# Patient Record
Sex: MALE | Race: WHITE | Employment: FULL TIME | URBAN - METROPOLITAN AREA
[De-identification: names, ages, dates, MRNs, and addresses within clinical notes are randomized per-mention and may not be internally consistent; named-entity substitution may affect disease eponyms.]

---

## 2017-01-11 ENCOUNTER — OFFICE VISIT (OUTPATIENT)
Dept: CARDIOLOGY CLINIC | Age: 62
End: 2017-01-11

## 2017-01-11 VITALS
DIASTOLIC BLOOD PRESSURE: 82 MMHG | HEIGHT: 66 IN | SYSTOLIC BLOOD PRESSURE: 164 MMHG | BODY MASS INDEX: 23.18 KG/M2 | RESPIRATION RATE: 16 BRPM | HEART RATE: 136 BPM | WEIGHT: 144.2 LBS | OXYGEN SATURATION: 99 %

## 2017-01-11 DIAGNOSIS — I48.0 PAROXYSMAL ATRIAL FIBRILLATION (HCC): Primary | ICD-10-CM

## 2017-01-11 NOTE — PROGRESS NOTES
Visit Vitals    /82 (BP 1 Location: Left arm, BP Patient Position: Sitting)    Pulse (!) 136    Resp 16    Ht 5' 6\" (1.676 m)    Wt 144 lb 3.2 oz (65.4 kg)    SpO2 99%    BMI 23.27 kg/m2

## 2017-01-11 NOTE — PROGRESS NOTES
HISTORY OF PRESENTING ILLNESS      Arely Salter is a 59 y.o. male established patient with history of PAF. Echocardiogram in January 2016 demonstrated preserved LV function with moderate LAE and mild to moderate MR. In the past he has been noted [address] bradycardia in the 40's which lead to lowering of his metoprolol doses. Monitoring demonstrated rhythm strips suggestive of AFL rather than AF and he underwent EPS at which time AFL could not be induced; he thus underwent empiric CTI ablation with plan to monitor for recurrence of AF. He now is noted to have. EKG today shows tachycardia however patient/wife report overall controlled rates based on their Fitbit monitoring. He is asymptomatic today despite being in AF with RVR. Of note, he is only taking metoprolol daily rather than BID due to forgetfulness. ACTIVE PROBLEM LIST     Patient Active Problem List    Diagnosis Date Noted    Paroxysmal atrial fibrillation ([address] 12/17/2015    Essential hypertension 12/17/2015    MR (mitral regurgitation) 12/17/2015           PAST MEDICAL HISTORY     Past Medical History   Diagnosis Date    Gout     HTN (hypertension)            PAST SURGICAL HISTORY     History reviewed. No pertinent past surgical history. ALLERGIES     No Known Allergies       FAMILY HISTORY     History reviewed. No pertinent family history.  negative for cardiac disease       SOCIAL HISTORY     Social History     Social History    Marital status:      Spouse name: N/A    Number of children: N/A    Years of education: N/A     Social History Main Topics    Smoking status: Former Smoker    Smokeless tobacco: Never Used    Alcohol use 1.0 oz/week     2 Glasses of wine per week    Drug use: No    Sexual activity: Not Asked     Other Topics Concern    None     Social History Narrative         MEDICATIONS     Current Outpatient Prescriptions   Medication Sig    metoprolol tartrate (LOPRESSOR) 25 mg tablet Take 1 Tab by mouth two (2) times a day.  lisinopril-hydrochlorothiazide (PRINZIDE, ZESTORETIC) 20-12.5 mg per tablet Take 1 Tab by mouth daily.  cholecalciferol, VITAMIN D3, (VITAMIN D3) 5,000 unit tab tablet Take  by mouth daily.  TURMERIC ROOT EXTRACT PO Take  by mouth daily.  garlic 2,996 mg cap Take 1,500 mg by mouth daily.  red yeast rice extract 600 mg cap Take 600 mg by mouth daily.  cyanocobalamin, vitamin B-12, 2,500 mcg tab Take 500 mg by mouth daily. No current facility-administered medications for this visit. I have reviewed the nurses notes, vitals, problem list, allergy list, medical history, family, social history and medications. REVIEW OF SYMPTOMS      General: Pt denies excessive weight gain or loss. Pt is able to conduct ADL's  HEENT: Denies blurred vision, headaches, hearing loss, epistaxis and difficulty swallowing. Respiratory: Denies cough, congestion, shortness of breath, DAVIES, wheezing or stridor. Cardiovascular: Denies precordial pain, palpitations, edema or PND  Gastrointestinal: Denies poor appetite, indigestion, abdominal pain or blood in stool  Genitourinary: Denies hematuria, dysuria, increased urinary frequency  Musculoskeletal: Denies joint pain or swelling from muscles or joints  Neurologic: Denies tremor, paresthesias, headache, or sensory motor disturbance  Psychiatric: Denies confusion, insomnia, depression  Integumentray: Denies rash, itching or ulcers. Hematologic: Denies easy bruising, bleeding     PHYSICAL EXAMINATION      Vitals:    01/11/17 1547   Resp: 16   Weight: 144 lb 3.2 oz (65.4 kg)   Height: 5' 6\" (1.676 m)     General: Well developed, in no acute distress.   HEENT: No jaundice, oral mucosa moist, no oral ulcers  Neck: Supple, no stiffness, no lymphadenopathy, supple  Heart:  Irregularly irregular, no murmur, gallop or rub, no jugular venous distention  Respiratory: Clear bilaterally x 4, no wheezing or rales  Abdomen:   Soft, non-tender, bowel sounds are active.   Extremities:  No edema, normal cap refill, no cyanosis. Musculoskeletal: No clubbing, no deformities  Neuro: A&Ox3, speech clear, gait stable, cooperative, no focal neurologic deficits  Skin: Skin color is normal. No rashes or lesions. Non diaphoretic, moist.  Vascular: 2+ pulses symmetric in all extremities       DIAGNOSTIC DATA      EKG: Atrial fibrillation at 133 bpm       LABORATORY DATA      Lab Results   Component Value Date/Time    WBC 8.9 04/13/2016 04:40 PM    HGB 14.2 04/13/2016 04:40 PM    HCT 41.6 04/13/2016 04:40 PM    PLATELET 665 33/41/2706 04:40 PM    MCV 93 04/13/2016 04:40 PM      Lab Results   Component Value Date/Time    Sodium 137 04/13/2016 04:40 PM    Potassium 5.3 04/13/2016 04:40 PM    Chloride 97 04/13/2016 04:40 PM    CO2 23 04/13/2016 04:40 PM    Glucose 86 04/13/2016 04:40 PM    BUN 29 04/13/2016 04:40 PM    Creatinine 0.93 04/13/2016 04:40 PM    BUN/Creatinine ratio 31 04/13/2016 04:40 PM    GFR est  04/13/2016 04:40 PM    GFR est non-AA 88 04/13/2016 04:40 PM    Calcium 9.5 04/13/2016 04:40 PM           ASSESSMENT      1. Atrial fibrillation              A. Lowered metoprolol to 25 mg BID; multaq in the past   B. Persistent   C. Asymptomatic  2. Atrial flutter   A. S/p CTI ablation  3. Moderate LAE  4. Mild MR       PLAN     Advised patient to take metoprolol BID as prescribed rather than daily. Patient will monitor HR control. If uncontrolled, will double metoprolol dose. Defer AF ablation as patient is asymptomatic. Anticoagulation in 2 years once he is 71 y/o. FOLLOW-UP     Follow up 3 months    Thank you, Dr. Nirmala Mercado for involving me in the care of this extraordinarily pleasant male. Please do not hesitate to contact me for further questions/concerns.      Written by Ivana Robbins, as dictated by Jimena Wild MD.     Jimena Wild MD  Cardiac Electrophysiology / Cardiology    [address], [facility], [address] [facility], [place]  [phone] / [phone] Fax   [phone] / [phone] Fax

## 2017-01-23 RX ORDER — LISINOPRIL AND HYDROCHLOROTHIAZIDE 12.5; 2 MG/1; MG/1
1 TABLET ORAL DAILY
Qty: 90 TAB | Refills: 2 | Status: SHIPPED | OUTPATIENT
Start: 2017-01-23 | End: 2017-08-02 | Stop reason: SDUPTHER

## 2017-01-23 NOTE — TELEPHONE ENCOUNTER
Requested Prescriptions     Signed Prescriptions Disp Refills    lisinopril-hydroCHLOROthiazide (PRINZIDE, ZESTORETIC) 20-12.5 mg per tablet 90 Tab 2     Sig: Take 1 Tab by mouth daily.      Authorizing Provider: Mane Vieyra     Ordering User: Marina Kwong

## 2017-05-09 ENCOUNTER — OFFICE VISIT (OUTPATIENT)
Dept: CARDIOLOGY CLINIC | Age: 62
End: 2017-05-09

## 2017-05-09 DIAGNOSIS — I48.91 ATRIAL FIBRILLATION BY ELECTROCARDIOGRAM (HCC): Primary | ICD-10-CM

## 2017-05-09 DIAGNOSIS — I48.0 PAROXYSMAL ATRIAL FIBRILLATION (HCC): ICD-10-CM

## 2017-05-09 NOTE — PROGRESS NOTES
HISTORY OF PRESENTING ILLNESS      Yovani Au is a 59 y.o. male established patient with history of PAF. Echocardiogram in January 2016 demonstrated preserved LV function with moderate LAE and mild to moderate MR. In the past he has been noted to have bradycardia in the 40's which led to lowering of his metoprolol doses. Monitoring demonstrated rhythm strips suggestive of AFL rather than AF and he underwent EPS at which time AFL could not be induced; he thus underwent empiric CTI ablation with plan to monitor for recurrence of AF. He subsequently had atrial fibrillation with RVR however was asymptomatic. He reported taking metoprolol daily rather than BID due to forgetfulness. We advised patient to take metoprolol BID as prescribed rather than daily and requested the patient monitor HR control. If uncontrolled, we planned to double metoprolol dose. We opted to defer AF ablation as patient was asymptomatic. ACTIVE PROBLEM LIST     Patient Active Problem List    Diagnosis Date Noted    Paroxysmal atrial fibrillation ([facility] [address] 12/17/2015    Essential hypertension 12/17/2015    MR (mitral regurgitation) 12/17/2015           PAST MEDICAL HISTORY     Past Medical History:   Diagnosis Date    Gout     HTN (hypertension)            PAST SURGICAL HISTORY     No past surgical history on file. ALLERGIES     No Known Allergies       FAMILY HISTORY     No family history on file.  negative for cardiac disease       SOCIAL HISTORY     Social History     Social History    Marital status:      Spouse name: N/A    Number of children: N/A    Years of education: N/A     Social History Main Topics    Smoking status: Former Smoker    Smokeless tobacco: Never Used    Alcohol use 1.0 oz/week     2 Glasses of wine per week    Drug use: No    Sexual activity: Not on file     Other Topics Concern    Not on file     Social History Narrative         MEDICATIONS     Current Outpatient Prescriptions Medication Sig    lisinopril-hydroCHLOROthiazide (PRINZIDE, ZESTORETIC) 20-12.5 mg per tablet Take 1 Tab by mouth daily.  metoprolol tartrate (LOPRESSOR) 25 mg tablet Take 1 Tab by mouth two (2) times a day.  cholecalciferol, VITAMIN D3, (VITAMIN D3) 5,000 unit tab tablet Take  by mouth daily.  TURMERIC ROOT EXTRACT PO Take  by mouth daily.  garlic 0,321 mg cap Take 1,500 mg by mouth daily.  red yeast rice extract 600 mg cap Take 600 mg by mouth daily.  cyanocobalamin, vitamin B-12, 2,500 mcg tab Take 500 mg by mouth daily. No current facility-administered medications for this visit. I have reviewed the nurses notes, vitals, problem list, allergy list, medical history, family, social history and medications. REVIEW OF SYMPTOMS      General: Pt denies excessive weight gain or loss. Pt is able to conduct ADL's  HEENT: Denies blurred vision, headaches, hearing loss, epistaxis and difficulty swallowing. Respiratory: Denies cough, congestion, shortness of breath, DAVIES, wheezing or stridor. Cardiovascular: Denies precordial pain, palpitations, edema or PND  Gastrointestinal: Denies poor appetite, indigestion, abdominal pain or blood in stool  Genitourinary: Denies hematuria, dysuria, increased urinary frequency  Musculoskeletal: Denies joint pain or swelling from muscles or joints  Neurologic: Denies tremor, paresthesias, headache, or sensory motor disturbance  Psychiatric: Denies confusion, insomnia, depression  Integumentray: Denies rash, itching or ulcers. Hematologic: Denies easy bruising, bleeding     PHYSICAL EXAMINATION      There were no vitals filed for this visit. General: Well developed, in no acute distress. HEENT: No jaundice, oral mucosa moist, no oral ulcers  Neck: Supple, no stiffness, no lymphadenopathy, supple  Heart:  Normal S1/S2 negative S3 or S4.  Regular, no murmur, gallop or rub, no jugular venous distention  Respiratory: Clear bilaterally x 4, no wheezing or rales  Abdomen:   Soft, non-tender, bowel sounds are active.   Extremities:  No edema, normal cap refill, no cyanosis. Musculoskeletal: No clubbing, no deformities  Neuro: A&Ox3, speech clear, gait stable, cooperative, no focal neurologic deficits  Skin: Skin color is normal. No rashes or lesions. Non diaphoretic, moist.  Vascular: 2+ pulses symmetric in all extremities       DIAGNOSTIC DATA      EKG: ***       LABORATORY DATA      Lab Results   Component Value Date/Time    WBC 8.9 04/13/2016 04:40 PM    HGB 14.2 04/13/2016 04:40 PM    HCT 41.6 04/13/2016 04:40 PM    PLATELET 901 15/06/5119 04:40 PM    MCV 93 04/13/2016 04:40 PM      Lab Results   Component Value Date/Time    Sodium 137 04/13/2016 04:40 PM    Potassium 5.3 04/13/2016 04:40 PM    Chloride 97 04/13/2016 04:40 PM    CO2 23 04/13/2016 04:40 PM    Glucose 86 04/13/2016 04:40 PM    BUN 29 04/13/2016 04:40 PM    Creatinine 0.93 04/13/2016 04:40 PM    BUN/Creatinine ratio 31 04/13/2016 04:40 PM    GFR est  04/13/2016 04:40 PM    GFR est non-AA 88 04/13/2016 04:40 PM    Calcium 9.5 04/13/2016 04:40 PM           ASSESSMENT      1. Atrial fibrillation           A. Lowered metoprolol to 25 mg BID; multaq in the past   B. Persistent   C. Asymptomatic  2. Atrial flutter   A. S/p CTI ablation  3. Moderate LAE  4. Mild MR       PLAN     ***     FOLLOW-UP     ***      Thank you, Dr. Bob Narvaez for involving me in the care of this extraordinarily pleasant male. Please do not hesitate to contact me for further questions/concerns.          Donte Pan MD  Cardiac Electrophysiology / Cardiology    [address]  [address] [facility], [address] [facility]    [address] [facility]  [phone] / [phone] Fax   [phone] / [phone] Fax

## 2017-08-02 DIAGNOSIS — I48.0 PAROXYSMAL ATRIAL FIBRILLATION (HCC): ICD-10-CM

## 2017-08-02 DIAGNOSIS — I10 ESSENTIAL HYPERTENSION WITH GOAL BLOOD PRESSURE LESS THAN 140/90: ICD-10-CM

## 2017-08-02 RX ORDER — LISINOPRIL AND HYDROCHLOROTHIAZIDE 12.5; 2 MG/1; MG/1
1 TABLET ORAL DAILY
Qty: 90 TAB | Refills: 1 | Status: SHIPPED | OUTPATIENT
Start: 2017-08-02 | End: 2018-01-01

## 2017-08-02 RX ORDER — METOPROLOL TARTRATE 25 MG/1
25 TABLET, FILM COATED ORAL 2 TIMES DAILY
Qty: 180 TAB | Refills: 1 | Status: SHIPPED | OUTPATIENT
Start: 2017-08-02 | End: 2018-01-01

## 2017-08-02 NOTE — TELEPHONE ENCOUNTER
Requested Prescriptions     Signed Prescriptions Disp Refills    lisinopril-hydroCHLOROthiazide (PRINZIDE, ZESTORETIC) 20-12.5 mg per tablet 90 Tab 1     Sig: Take 1 Tab by mouth daily. Authorizing Provider: Queenie Martin User: Mayra Pollard    metoprolol tartrate (LOPRESSOR) 25 mg tablet 180 Tab 1     Sig: Take 1 Tab by mouth two (2) times a day. Authorizing Provider: Queenie Martin User: Mayra Pollard   Patient needs to call for an appointment. He is overdue for follow up.

## 2018-01-01 ENCOUNTER — HOSPITAL ENCOUNTER (EMERGENCY)
Age: 63
Discharge: OTHER INSTITUTION | End: 2018-03-20
Attending: EMERGENCY MEDICINE
Payer: COMMERCIAL

## 2018-01-01 ENCOUNTER — APPOINTMENT (OUTPATIENT)
Dept: GENERAL RADIOLOGY | Age: 63
End: 2018-01-01
Attending: EMERGENCY MEDICINE
Payer: COMMERCIAL

## 2018-01-01 ENCOUNTER — APPOINTMENT (OUTPATIENT)
Dept: CT IMAGING | Age: 63
End: 2018-01-01
Attending: FAMILY MEDICINE
Payer: COMMERCIAL

## 2018-01-01 ENCOUNTER — TELEPHONE (OUTPATIENT)
Dept: FAMILY MEDICINE CLINIC | Age: 63
End: 2018-01-01

## 2018-01-01 ENCOUNTER — APPOINTMENT (OUTPATIENT)
Dept: CT IMAGING | Age: 63
End: 2018-01-01
Attending: EMERGENCY MEDICINE
Payer: COMMERCIAL

## 2018-01-01 ENCOUNTER — OFFICE VISIT (OUTPATIENT)
Dept: FAMILY MEDICINE CLINIC | Age: 63
End: 2018-01-01

## 2018-01-01 ENCOUNTER — HOSPITAL ENCOUNTER (OUTPATIENT)
Dept: LAB | Age: 63
Discharge: HOME | End: 2018-06-22
Payer: MEDICARE

## 2018-01-01 VITALS
OXYGEN SATURATION: 97 % | DIASTOLIC BLOOD PRESSURE: 69 MMHG | HEART RATE: 70 BPM | RESPIRATION RATE: 16 BRPM | SYSTOLIC BLOOD PRESSURE: 102 MMHG | TEMPERATURE: 98 F | HEIGHT: 66 IN

## 2018-01-01 VITALS
SYSTOLIC BLOOD PRESSURE: 201 MMHG | WEIGHT: 135 LBS | BODY MASS INDEX: 21.69 KG/M2 | HEART RATE: 116 BPM | RESPIRATION RATE: 30 BRPM | OXYGEN SATURATION: 93 % | DIASTOLIC BLOOD PRESSURE: 122 MMHG | TEMPERATURE: 97.9 F | HEIGHT: 66 IN

## 2018-01-01 VITALS
DIASTOLIC BLOOD PRESSURE: 60 MMHG | HEART RATE: 72 BPM | RESPIRATION RATE: 18 BRPM | HEIGHT: 66 IN | BODY MASS INDEX: 19.61 KG/M2 | TEMPERATURE: 97.2 F | SYSTOLIC BLOOD PRESSURE: 112 MMHG | WEIGHT: 122 LBS | OXYGEN SATURATION: 100 %

## 2018-01-01 DIAGNOSIS — Z76.89 ENCOUNTER TO ESTABLISH CARE: ICD-10-CM

## 2018-01-01 DIAGNOSIS — R60.0 LOCALIZED EDEMA: Primary | ICD-10-CM

## 2018-01-01 DIAGNOSIS — I48.0 PAROXYSMAL ATRIAL FIBRILLATION (HCC): ICD-10-CM

## 2018-01-01 DIAGNOSIS — I48.91 ATRIAL FIBRILLATION, UNSPECIFIED TYPE (HCC): ICD-10-CM

## 2018-01-01 DIAGNOSIS — R10.84 ABDOMINAL PAIN, GENERALIZED: ICD-10-CM

## 2018-01-01 DIAGNOSIS — I10 ESSENTIAL HYPERTENSION: Primary | ICD-10-CM

## 2018-01-01 DIAGNOSIS — K55.059 ACUTE MESENTERIC ISCHEMIA (HCC): Primary | ICD-10-CM

## 2018-01-01 LAB
ALBUMIN SERPL-MCNC: 1.6 G/DL (ref 3.6–4.8)
ALBUMIN SERPL-MCNC: 4.1 G/DL (ref 3.5–5)
ALBUMIN/GLOB SERPL: 0.8 {RATIO} (ref 1.2–2.2)
ALBUMIN/GLOB SERPL: 1.2 {RATIO} (ref 1.1–2.2)
ALP SERPL-CCNC: 77 IU/L (ref 39–117)
ALP SERPL-CCNC: 79 U/L (ref 45–117)
ALT SERPL-CCNC: 12 IU/L (ref 0–44)
ALT SERPL-CCNC: 51 U/L (ref 12–78)
ANION GAP SERPL CALC-SCNC: 18 MMOL/L (ref 5–15)
APPEARANCE UR: CLEAR
APTT PPP: 22.5 SEC (ref 22.1–32)
AST SERPL-CCNC: 16 IU/L (ref 0–40)
AST SERPL-CCNC: 68 U/L (ref 15–37)
ATRIAL RATE: 315 BPM
BACTERIA SPEC CULT: NORMAL
BACTERIA URNS QL MICRO: NEGATIVE /HPF
BASOPHILS # BLD: 0 K/UL (ref 0–0.1)
BASOPHILS NFR BLD: 0 % (ref 0–1)
BILIRUB SERPL-MCNC: 0.2 MG/DL (ref 0–1.2)
BILIRUB SERPL-MCNC: 0.7 MG/DL (ref 0.2–1)
BILIRUB UR QL: NEGATIVE
BUN SERPL-MCNC: 23 MG/DL (ref 8–27)
BUN SERPL-MCNC: 33 MG/DL (ref 6–20)
BUN/CREAT SERPL: 21 (ref 12–20)
BUN/CREAT SERPL: 9 (ref 10–24)
CALCIUM SERPL-MCNC: 7.4 MG/DL (ref 8.6–10.2)
CALCIUM SERPL-MCNC: 9.7 MG/DL (ref 8.5–10.1)
CALCULATED R AXIS, ECG10: 16 DEGREES
CALCULATED T AXIS, ECG11: -86 DEGREES
CHLORIDE SERPL-SCNC: 103 MMOL/L (ref 96–106)
CHLORIDE SERPL-SCNC: 105 MMOL/L (ref 97–108)
CO2 SERPL-SCNC: 21 MMOL/L (ref 21–32)
CO2 SERPL-SCNC: 22 MMOL/L (ref 20–29)
COLOR UR: ABNORMAL
CREAT SERPL-MCNC: 1.59 MG/DL (ref 0.7–1.3)
CREAT SERPL-MCNC: 2.61 MG/DL (ref 0.76–1.27)
DIAGNOSIS, 93000: NORMAL
DIFFERENTIAL METHOD BLD: ABNORMAL
EOSINOPHIL # BLD: 0 K/UL (ref 0–0.4)
EOSINOPHIL NFR BLD: 0 % (ref 0–7)
EPITH CASTS URNS QL MICRO: ABNORMAL /LPF
ERYTHROCYTE [DISTWIDTH] IN BLOOD BY AUTOMATED COUNT: 13.2 % (ref 11.5–14.5)
GFR SERPLBLD CREATININE-BSD FMLA CKD-EPI: 25 ML/MIN/1.73
GFR SERPLBLD CREATININE-BSD FMLA CKD-EPI: 29 ML/MIN/1.73
GLOBULIN SER CALC-MCNC: 2.1 G/DL (ref 1.5–4.5)
GLOBULIN SER CALC-MCNC: 3.5 G/DL (ref 2–4)
GLUCOSE SERPL-MCNC: 215 MG/DL (ref 65–100)
GLUCOSE SERPL-MCNC: 79 MG/DL (ref 65–99)
GLUCOSE UR STRIP.AUTO-MCNC: 100 MG/DL
HCT VFR BLD AUTO: 47.2 % (ref 36.6–50.3)
HEMOCCULT STL QL: NEGATIVE
HGB BLD-MCNC: 15.1 G/DL (ref 12.1–17)
HGB UR QL STRIP: ABNORMAL
HYALINE CASTS URNS QL MICRO: ABNORMAL /LPF (ref 0–5)
IMM GRANULOCYTES # BLD: 0 K/UL (ref 0–0.04)
IMM GRANULOCYTES NFR BLD AUTO: 0 % (ref 0–0.5)
INR PPP: 1.1 (ref 0.9–1.1)
INTERPRETATION: NORMAL
KETONES UR QL STRIP.AUTO: ABNORMAL MG/DL
LACTATE SERPL-SCNC: 6 MMOL/L (ref 0.4–2)
LEUKOCYTE ESTERASE UR QL STRIP.AUTO: NEGATIVE
LIPASE SERPL-CCNC: 138 U/L (ref 73–393)
LYMPHOCYTES # BLD: 0.6 K/UL (ref 0.8–3.5)
LYMPHOCYTES NFR BLD: 3 % (ref 12–49)
MCH RBC QN AUTO: 31.7 PG (ref 26–34)
MCHC RBC AUTO-ENTMCNC: 32 G/DL (ref 30–36.5)
MCV RBC AUTO: 99 FL (ref 80–99)
MONOCYTES # BLD: 1.4 K/UL (ref 0–1)
MONOCYTES NFR BLD: 7 % (ref 5–13)
NEUTS SEG # BLD: 17.6 K/UL (ref 1.8–8)
NEUTS SEG NFR BLD: 90 % (ref 32–75)
NITRITE UR QL STRIP.AUTO: NEGATIVE
NRBC # BLD: 0 K/UL (ref 0–0.01)
NRBC BLD-RTO: 0 PER 100 WBC
PH UR STRIP: 5 [PH] (ref 5–8)
PLATELET # BLD AUTO: 316 K/UL (ref 150–400)
PMV BLD AUTO: 9.6 FL (ref 8.9–12.9)
POTASSIUM SERPL-SCNC: 4.3 MMOL/L (ref 3.5–5.2)
POTASSIUM SERPL-SCNC: 4.4 MMOL/L (ref 3.5–5.1)
PROT SERPL-MCNC: 3.7 G/DL (ref 6–8.5)
PROT SERPL-MCNC: 7.6 G/DL (ref 6.4–8.2)
PROT UR STRIP-MCNC: 100 MG/DL
PROTHROMBIN TIME: 11.4 SEC (ref 9–11.1)
Q-T INTERVAL, ECG07: 316 MS
QRS DURATION, ECG06: 84 MS
QTC CALCULATION (BEZET), ECG08: 486 MS
RBC # BLD AUTO: 4.77 M/UL (ref 4.1–5.7)
RBC #/AREA URNS HPF: ABNORMAL /HPF (ref 0–5)
RBC MORPH BLD: ABNORMAL
SERVICE CMNT-IMP: NORMAL
SODIUM SERPL-SCNC: 136 MMOL/L (ref 134–144)
SODIUM SERPL-SCNC: 144 MMOL/L (ref 136–145)
SP GR UR REFRACTOMETRY: >1.03 (ref 1–1.03)
THERAPEUTIC RANGE,PTTT: NORMAL SECS (ref 58–77)
UA: UC IF INDICATED,UAUC: ABNORMAL
UROBILINOGEN UR QL STRIP.AUTO: 0.2 EU/DL (ref 0.2–1)
VENTRICULAR RATE, ECG03: 142 BPM
WBC # BLD AUTO: 19.6 K/UL (ref 4.1–11.1)
WBC MORPH BLD: ABNORMAL
WBC URNS QL MICRO: ABNORMAL /HPF (ref 0–4)

## 2018-01-01 PROCEDURE — 96361 HYDRATE IV INFUSION ADD-ON: CPT

## 2018-01-01 PROCEDURE — 71045 X-RAY EXAM CHEST 1 VIEW: CPT

## 2018-01-01 PROCEDURE — 87040 BLOOD CULTURE FOR BACTERIA: CPT | Performed by: FAMILY MEDICINE

## 2018-01-01 PROCEDURE — 82272 OCCULT BLD FECES 1-3 TESTS: CPT | Performed by: EMERGENCY MEDICINE

## 2018-01-01 PROCEDURE — 99285 EMERGENCY DEPT VISIT HI MDM: CPT

## 2018-01-01 PROCEDURE — 74011000250 HC RX REV CODE- 250: Performed by: FAMILY MEDICINE

## 2018-01-01 PROCEDURE — 74011250636 HC RX REV CODE- 250/636: Performed by: EMERGENCY MEDICINE

## 2018-01-01 PROCEDURE — 74178 CT ABD&PLV WO CNTR FLWD CNTR: CPT

## 2018-01-01 PROCEDURE — 83690 ASSAY OF LIPASE: CPT | Performed by: FAMILY MEDICINE

## 2018-01-01 PROCEDURE — 85025 COMPLETE CBC W/AUTO DIFF WBC: CPT | Performed by: FAMILY MEDICINE

## 2018-01-01 PROCEDURE — 96375 TX/PRO/DX INJ NEW DRUG ADDON: CPT

## 2018-01-01 PROCEDURE — 96365 THER/PROPH/DIAG IV INF INIT: CPT

## 2018-01-01 PROCEDURE — 36415 COLL VENOUS BLD VENIPUNCTURE: CPT | Performed by: FAMILY MEDICINE

## 2018-01-01 PROCEDURE — 80053 COMPREHEN METABOLIC PANEL: CPT

## 2018-01-01 PROCEDURE — 81001 URINALYSIS AUTO W/SCOPE: CPT | Performed by: FAMILY MEDICINE

## 2018-01-01 PROCEDURE — 85730 THROMBOPLASTIN TIME PARTIAL: CPT | Performed by: EMERGENCY MEDICINE

## 2018-01-01 PROCEDURE — 80053 COMPREHEN METABOLIC PANEL: CPT | Performed by: FAMILY MEDICINE

## 2018-01-01 PROCEDURE — 96376 TX/PRO/DX INJ SAME DRUG ADON: CPT

## 2018-01-01 PROCEDURE — 36415 COLL VENOUS BLD VENIPUNCTURE: CPT

## 2018-01-01 PROCEDURE — 85610 PROTHROMBIN TIME: CPT | Performed by: EMERGENCY MEDICINE

## 2018-01-01 PROCEDURE — 83605 ASSAY OF LACTIC ACID: CPT | Performed by: FAMILY MEDICINE

## 2018-01-01 PROCEDURE — 74011000258 HC RX REV CODE- 258: Performed by: FAMILY MEDICINE

## 2018-01-01 PROCEDURE — 74011250636 HC RX REV CODE- 250/636: Performed by: FAMILY MEDICINE

## 2018-01-01 PROCEDURE — 77030005563 HC CATH URETH INT MMGH -A

## 2018-01-01 PROCEDURE — 74011636320 HC RX REV CODE- 636/320: Performed by: RADIOLOGY

## 2018-01-01 PROCEDURE — 93005 ELECTROCARDIOGRAM TRACING: CPT

## 2018-01-01 RX ORDER — MIDODRINE HYDROCHLORIDE 2.5 MG/1
4 TABLET ORAL 2 TIMES DAILY
Refills: 0 | COMMUNITY
Start: 2018-01-01

## 2018-01-01 RX ORDER — GUAIFENESIN 100 MG/5ML
81 LIQUID (ML) ORAL DAILY
COMMUNITY

## 2018-01-01 RX ORDER — ENALAPRILAT 1.25 MG/ML
1.25 INJECTION INTRAVENOUS
Status: COMPLETED | OUTPATIENT
Start: 2018-01-01 | End: 2018-01-01

## 2018-01-01 RX ORDER — MORPHINE SULFATE 2 MG/ML
2 INJECTION, SOLUTION INTRAMUSCULAR; INTRAVENOUS
Status: COMPLETED | OUTPATIENT
Start: 2018-01-01 | End: 2018-01-01

## 2018-01-01 RX ORDER — HYDROMORPHONE HYDROCHLORIDE 2 MG/ML
1 INJECTION, SOLUTION INTRAMUSCULAR; INTRAVENOUS; SUBCUTANEOUS
Status: COMPLETED | OUTPATIENT
Start: 2018-01-01 | End: 2018-01-01

## 2018-01-01 RX ORDER — MORPHINE SULFATE 4 MG/ML
4 INJECTION INTRAVENOUS
Status: COMPLETED | OUTPATIENT
Start: 2018-01-01 | End: 2018-01-01

## 2018-01-01 RX ORDER — METOPROLOL SUCCINATE 50 MG/1
TABLET, EXTENDED RELEASE ORAL
COMMUNITY
Start: 2015-11-20

## 2018-01-01 RX ORDER — CHOLESTYRAMINE 4 G/9G
1 POWDER, FOR SUSPENSION ORAL 2 TIMES DAILY WITH MEALS
Refills: 4 | COMMUNITY
Start: 2018-01-01

## 2018-01-01 RX ORDER — HYDROCODONE BITARTRATE AND ACETAMINOPHEN 5; 325 MG/1; MG/1
1 TABLET ORAL
Refills: 0 | COMMUNITY
Start: 2018-01-01

## 2018-01-01 RX ORDER — ONDANSETRON 2 MG/ML
4 INJECTION INTRAMUSCULAR; INTRAVENOUS
Status: COMPLETED | OUTPATIENT
Start: 2018-01-01 | End: 2018-01-01

## 2018-01-01 RX ORDER — ADENOSINE 3 MG/ML
12 INJECTION, SOLUTION INTRAVENOUS
Status: COMPLETED | OUTPATIENT
Start: 2018-01-01 | End: 2018-01-01

## 2018-01-01 RX ORDER — HEPARIN SODIUM 10000 [USP'U]/100ML
1000 INJECTION, SOLUTION INTRAVENOUS CONTINUOUS
Status: DISCONTINUED | OUTPATIENT
Start: 2018-01-01 | End: 2018-01-01 | Stop reason: HOSPADM

## 2018-01-01 RX ORDER — METOPROLOL TARTRATE 5 MG/5ML
5 INJECTION INTRAVENOUS
Status: COMPLETED | OUTPATIENT
Start: 2018-01-01 | End: 2018-01-01

## 2018-01-01 RX ORDER — LISINOPRIL AND HYDROCHLOROTHIAZIDE 12.5; 2 MG/1; MG/1
TABLET ORAL
COMMUNITY
Start: 2015-08-12

## 2018-01-01 RX ORDER — COLESEVELAM 180 1/1
3 TABLET ORAL 2 TIMES DAILY
Refills: 4 | COMMUNITY
Start: 2018-01-01

## 2018-01-01 RX ORDER — HEPARIN SODIUM 5000 [USP'U]/ML
5000 INJECTION, SOLUTION INTRAVENOUS; SUBCUTANEOUS ONCE
Status: COMPLETED | OUTPATIENT
Start: 2018-01-01 | End: 2018-01-01

## 2018-01-01 RX ADMIN — MORPHINE SULFATE 2 MG: 2 INJECTION, SOLUTION INTRAMUSCULAR; INTRAVENOUS at 17:11

## 2018-01-01 RX ADMIN — MORPHINE SULFATE 4 MG: 4 INJECTION INTRAVENOUS at 17:33

## 2018-01-01 RX ADMIN — ONDANSETRON 4 MG: 2 INJECTION INTRAMUSCULAR; INTRAVENOUS at 19:01

## 2018-01-01 RX ADMIN — HYDROMORPHONE HYDROCHLORIDE 1 MG: 2 INJECTION, SOLUTION INTRAMUSCULAR; INTRAVENOUS; SUBCUTANEOUS at 19:03

## 2018-01-01 RX ADMIN — SODIUM CHLORIDE 1836 ML: 900 INJECTION, SOLUTION INTRAVENOUS at 18:02

## 2018-01-01 RX ADMIN — METOROPROLOL TARTRATE 5 MG: 5 INJECTION, SOLUTION INTRAVENOUS at 16:58

## 2018-01-01 RX ADMIN — HEPARIN SODIUM 1000 UNITS/HR: 10000 INJECTION, SOLUTION INTRAVENOUS at 19:36

## 2018-01-01 RX ADMIN — IOPAMIDOL 80 ML: 755 INJECTION, SOLUTION INTRAVENOUS at 18:06

## 2018-01-01 RX ADMIN — SODIUM CHLORIDE 3.38 G: 900 INJECTION, SOLUTION INTRAVENOUS at 18:19

## 2018-01-01 RX ADMIN — ADENOSINE 12 MG: 3 INJECTION, SOLUTION INTRAVENOUS at 17:21

## 2018-01-01 RX ADMIN — HEPARIN SODIUM 5000 UNITS: 5000 INJECTION, SOLUTION INTRAVENOUS; SUBCUTANEOUS at 19:27

## 2018-01-01 RX ADMIN — ENALAPRILAT 1.25 MG: 2.5 INJECTION INTRAVENOUS at 17:09

## 2018-03-20 NOTE — ED PROVIDER NOTES
HPI Comments: He has h/o ablation post AFL 4 years ago and off of metoprolol 4 months ago due to bradycardia. He never had colonoscopy     Patient is a 57 y.o. male presenting with palpitations. The history is provided by the patient. Palpitations    This is a new (Pt went o urgent care with abdominl pain and noted to have high blood pressure and  tacycardia  hence sent to ED through squad) problem. The current episode started 6 to 12 hours ago. The problem has been gradually worsening. The problem occurs constantly. Associated symptoms include diaphoresis, abdominal pain, nausea and vomiting. Pertinent negatives include no fever, no malaise/fatigue, no chest pain, no chest pressure, no irregular heartbeat, no syncope, no headaches, no back pain, no dizziness and no shortness of breath. Risk factors include cardiac disease. Past Medical History:   Diagnosis Date    Gout     HTN (hypertension)        No past surgical history on file. No family history on file. Social History     Social History    Marital status:      Spouse name: N/A    Number of children: N/A    Years of education: N/A     Occupational History    Not on file. Social History Main Topics    Smoking status: Former Smoker    Smokeless tobacco: Never Used    Alcohol use 1.0 oz/week     2 Glasses of wine per week    Drug use: No    Sexual activity: Not on file     Other Topics Concern    Not on file     Social History Narrative         ALLERGIES: Review of patient's allergies indicates no known allergies. Review of Systems   Constitutional: Positive for diaphoresis. Negative for fever and malaise/fatigue. Respiratory: Negative for chest tightness and shortness of breath. Cardiovascular: Positive for palpitations. Negative for chest pain and syncope. Gastrointestinal: Positive for abdominal pain, nausea and vomiting. Negative for blood in stool and diarrhea.    Musculoskeletal: Negative for back pain and neck pain. Neurological: Negative for dizziness and headaches. All other systems reviewed and are negative. There were no vitals filed for this visit. Physical Exam   Constitutional: He is oriented to person, place, and time. He appears well-developed. He appears distressed. HENT:   Head: Normocephalic and atraumatic. Eyes: Pupils are equal, round, and reactive to light. Neck: No JVD present. Cardiovascular: Normal heart sounds and intact distal pulses. An irregularly irregular rhythm present. Tachycardia present. No murmur heard. Pulmonary/Chest: Effort normal and breath sounds normal. No respiratory distress. He has no wheezes. Abdominal: Soft. Bowel sounds are normal. There is tenderness. There is guarding. TTP across the umblicus with guarding   Genitourinary: Penis normal.   Musculoskeletal: Normal range of motion. He exhibits no edema, tenderness or deformity. Neurological: He is alert and oriented to person, place, and time. Skin: Skin is warm and dry. There is pallor. Psychiatric: He has a normal mood and affect. Nursing note and vitals reviewed. MDM  Number of Diagnoses or Management Options  Abdominal pain, generalized:   Acute mesenteric ischemia [facility]):   Paroxysmal atrial fibrillation [facility]):   Diagnosis management comments: Atrial flutter/atrial fib with RVR  Abdominal pain secondary ?  Infection/sepsis vs mesenteric ischemia  HTN urgency    EKG, CBC, CMP, lipase, lactic acid  Metoprolol, vasotec and 2mg morphine for pain    D/w Dr. Chris Faye         Amount and/or Complexity of Data Reviewed  Clinical lab tests: ordered and reviewed  Tests in the radiology section of CPT®: ordered and reviewed  Discuss the patient with other providers: yes  Independent visualization of images, tracings, or specimens: yes    Risk of Complications, Morbidity, and/or Mortality  Presenting problems: high  Diagnostic procedures: high  Management options: high    Critical Care  Total time providing critical care:  minutes    Patient Progress  Patient progress: other (comment) (Unchanged - critical -- threat to life due to bowel ischemia)        ED Course       Procedures   5:28 PM  Abdominal pain not better, WBC elevated, will give another 4mg of morphine and get CT abd pelvis w/o contrast as GFR down to 44 from 88. Lactic acid elevated. DDx ischemic colitis vs sepsis. Will start Abx zosyn after getting urine sample through straight cath    Pt unable to give urine sample yet  S/p adenocard he got into sinus rhythm and but still tachycardic. 1850 - Radiology called to discuss the results, +clot in SMA likely causing all the pain  The a-fib likely cause of clot, patient getting IVF and ABX  Discussed transfer of patient to [facility] due to patient with [facility] insurance  Starting heparin drip, SARAH - no gross blood              VITALS:   Patient Vitals for the past 8 hrs:   Temp Pulse Resp BP SpO2   03/20/18 1820 - (!) 129 25 (!) 180/143 94 %   03/20/18 1735 - (!) 139 28 (!) 179/121 93 %   03/20/18 1730 - (!) 121 (!) 32 (!) 206/153 98 %   03/20/18 1645 - (!) 145 (!) 35 (!) 215/146 96 %   03/20/18 1630 - - - (!) 213/161 94 %   03/20/18 1625 97.9 °F (36.6 °C) (!) 141 16 (!) 209/153 97 %                  Recent Results (from the past 24 hour(s))   CBC WITH AUTOMATED DIFF    Collection Time: 03/20/18  4:49 PM   Result Value Ref Range    WBC 19.6 (H) 4.1 - 11.1 K/uL    RBC 4.77 4.10 - 5.70 M/uL    HGB 15.1 12.1 - 17.0 g/dL    HCT 47.2 36.6 - 50.3 %    MCV 99.0 80.0 - 99.0 FL    MCH 31.7 26.0 - 34.0 PG    MCHC 32.0 30.0 - 36.5 g/dL    RDW 13.2 11.5 - 14.5 %    PLATELET 664 763 - 918 K/uL    MPV 9.6 8.9 - 12.9 FL    NRBC 0.0 0  WBC    ABSOLUTE NRBC 0.00 0.00 - 0.01 K/uL    NEUTROPHILS 90 (H) 32 - 75 %    LYMPHOCYTES 3 (L) 12 - 49 %    MONOCYTES 7 5 - 13 %    EOSINOPHILS 0 0 - 7 %    BASOPHILS 0 0 - 1 %    IMMATURE GRANULOCYTES 0 0.0 - 0.5 %    ABS.  NEUTROPHILS 17.6 (H) 1.8 - 8.0 K/UL    ABS. LYMPHOCYTES 0.6 (L) 0.8 - 3.5 K/UL    ABS. MONOCYTES 1.4 (H) 0.0 - 1.0 K/UL    ABS. EOSINOPHILS 0.0 0.0 - 0.4 K/UL    ABS. BASOPHILS 0.0 0.0 - 0.1 K/UL    ABS. IMM. GRANS. 0.0 0.00 - 0.04 K/UL    DF AUTOMATED      RBC COMMENTS NORMOCYTIC, NORMOCHROMIC      WBC COMMENTS REACTIVE LYMPHS     METABOLIC PANEL, COMPREHENSIVE    Collection Time: 03/20/18  4:49 PM   Result Value Ref Range    Sodium 144 136 - 145 mmol/L    Potassium 4.4 3.5 - 5.1 mmol/L    Chloride 105 97 - 108 mmol/L    CO2 21 21 - 32 mmol/L    Anion gap 18 (H) 5 - 15 mmol/L    Glucose 215 (H) 65 - 100 mg/dL    BUN 33 (H) 6 - 20 MG/DL    Creatinine 1.59 (H) 0.70 - 1.30 MG/DL    BUN/Creatinine ratio 21 (H) 12 - 20      GFR est AA 53 (L) >60 ml/min/1.73m2    GFR est non-AA 44 (L) >60 ml/min/1.73m2    Calcium 9.7 8.5 - 10.1 MG/DL    Bilirubin, total 0.7 0.2 - 1.0 MG/DL    ALT (SGPT) 51 12 - 78 U/L    AST (SGOT) 68 (H) 15 - 37 U/L    Alk. phosphatase 79 45 - 117 U/L    Protein, total 7.6 6.4 - 8.2 g/dL    Albumin 4.1 3.5 - 5.0 g/dL    Globulin 3.5 2.0 - 4.0 g/dL    A-G Ratio 1.2 1.1 - 2.2     LACTIC ACID    Collection Time: 03/20/18  4:49 PM   Result Value Ref Range    Lactic acid 6.0 (HH) 0.4 - 2.0 MMOL/L   LIPASE    Collection Time: 03/20/18  4:49 PM   Result Value Ref Range    Lipase 138 73 - 393 U/L   PTT    Collection Time: 03/20/18  4:49 PM   Result Value Ref Range    aPTT 22.5 22.1 - 32.0 sec    aPTT, therapeutic range     58.0 - 77.0 SECS   PROTHROMBIN TIME + INR    Collection Time: 03/20/18  4:49 PM   Result Value Ref Range    INR 1.1 0.9 - 1.1      Prothrombin time 11.4 (H) 9.0 - 11.1 sec   OCCULT BLOOD, STOOL    Collection Time: 03/20/18  7:09 PM   Result Value Ref Range    Occult blood, stool NEGATIVE  NEG         Ct Abd Pelv W Wo Cont    Result Date: 3/20/2018  EXAM:  CT ABD PELV W WO CONT INDICATION:   Abdominal pain. Ischemic colitis versus sepsis. GFR 44 down from 88, acute renal insufficiency?  History of atrial fibrillation. COMPARISON: None. INDICATION:  Abdominal pain EXAM: .  CT OF THE CHEST, ABDOMEN AND PELVIS WERE PERFORMED BOTH BEFORE AND AFTER INTRAVENOUS CONTRAST. Ashlee Tinsley POST-PROCESSING WAS PERFORMED. Sequential axial images of the  chest, abdomen and pelvis were performed both before and after intravenous contrast. Soft tissue, lung and bone windows were examined. Post-processing was performed for coronal and sagittal reformatting. CT dose reduction was achieved through use of a standardized protocol tailored for this examination and automatic exposure control for dose modulation. COMPARISON:  None. CONTRAST:  80 cc Isovue-370. FINDINGS: ABDOMEN: Tiny hypodensities in the liver are too small to characterize with imaging. A small hemangioma is suspected in the right hepatic lobe adjacent to the interlobar fissure, 1.8 x 1.1 cm. Liver and spleen parenchyma are otherwise homogeneous. The pancreas and adrenal glands are normal. The kidneys show heterogeneous enhancement throughout both kidneys, with wedge-shaped focal defects in the nephrogram bilaterally. There are also several small simple cysts. No calcifications. The renal arteries are patent bilaterally, but with heavy calcification at the renal artery origins. No obstruction or calculi. There is no ascites. A filling defect appears in the superior mesenteric artery distally consistent with thrombosis. There is reconstitution of distal small is more distal branches. There is associated bowel wall thickening in the distal small bowel, thought to represent mid ileum. There may be mild similar wall thickening in the sigmoid colon. Heavy aortic calcification without aneurysm. PELVIS:  Additional evaluation of the pelvis reveals no abnormal mass or fluid collection. Numerous diverticula throughout the colon, most severe in the sigmoid colon, but without associated acute inflammatory change. The urinary bladder is normally distended.  The distal ureters are normal. The appendix is normal. Incidentally noted bilateral inguinal hernias, small to moderate, containing fluid only. IMPRESSION: 1. 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 Possible wall thickening in the sigmoid colon, difficult to confirm. 2. Heterogeneous enhancement of renal parenchyma bilaterally, with renal arterial disease at the origins but no obvious renal arterial thrombosis. This CT picture is suspicious for bilateral renal parenchymal ischemia, particularly in view of the history of atrial fibrillation and the presence of SMA thrombosis, but bilateral pyelonephritis could also be considered. Correlate with clinical circumstances, laboratory parameters including urinalysis. 3. Diverticulosis without diverticulitis. 4. Contrast administration despite recently decreased GFR. Correlate clinically and follow-up for renal compromise. The findings were called to Dr. Milvia Sanders on 3/20/2018 at 1850 hours by Dr. Tram Conway. [facility]    Result Date: 3/20/2018  INDICATION:  Sepsis EXAM: Chest single view. COMPARISON: None. Jyoti Maza FINDINGS: A single frontal view of the chest at 1813 hours shows a nodular interstitial pattern throughout both lungs, without focal consolidation or pleural effusion obvious. The heart, mediastinum and pulmonary vasculature show heart size upper normal to mildly enlarged. .  The bony thorax is unremarkable for age. Jyoti Maza IMPRESSION: Nodular interstitial pattern throughout both lungs, without focal consolidation. Borderline cardiomegaly. .Follow-up for developing interstitial edema or interstitial infection.

## 2018-03-20 NOTE — ED NOTES
Assumed care of patient. Introduced self as primary nurse using [address] [place] [address] Nw. Stretcher in low locked position with call bell within reach. Pt updated on plan of care and wait times. Pt instructed to use call bell if assistance is needed.

## 2018-03-20 NOTE — ED NOTES
Verbal report given to [facility] (name) on Maira Pizarro being transferred to ED and TX to [facility] (unit) for routine progression of care    Report consisted of patient's Situation, Background, Assessment and Recommendations (SBAR)    Information from the following report(s)  SBAR, ED Summary, Intake/Output, MAR, Recent Results and Cardiac Rhythm afib was reviewed with the receiving nurse. Opportunity for questions and clarification was provided.     Patient transported with:  O2 @ 4 liters    Last Filed Values:  Temp: 97.9 °F (36.6 °C) (03/20/18 1625)  Pulse (Heart Rate): (!) 129 (03/20/18 1820)  Resp Rate: 25 (03/20/18 1820)  O2 Sat (%): 94 % (03/20/18 1820)  BP: (!) 180/143 (03/20/18 1820)  MAP (Monitor): 154 (03/20/18 1820)  MAP (Calculated): 172 (03/20/18 1625)  Level of Consciousness: Alert (03/20/18 1906)      Lab Results   Component Value Date/Time    WBC 19.6 (H) 03/20/2018 04:49 PM    Lactic acid 6.0 (HH) 03/20/2018 04:49 PM       Repeat LA:  Time Due 2049    Blood Cultures Drawn:  yes    Fluid Resuscitation:  Total needed 1836, Status infusing    All Antibiotics Started:  yes, Dose Due na    VS x 2 post-fluid resuscitation:   no    Vasopressor Infusion:  no na    Provider Reassessment needed and notified:  no , Due na/tx patient    Additional Interventions/Comments:  Patient SANIYA insurance, tx to chip

## 2018-03-20 NOTE — ED NOTES
Called [facility] for a stat transfer to [facility] ER, spoke with Alex Freed, relayed patient information, ETA is 46

## 2018-03-20 NOTE — ED NOTES
Verbal report was given over the phone to [facility] with [facility] transport who will be transporting pt to [address] [place] [address] report consisted of ED summary, MAR, recent results, and additional pertinent information. Opportunity given to ask questions and seek clarifications. ETA 20 min.

## 2018-03-20 NOTE — ED TRIAGE NOTES
1000 abd pain sharp and diffuse; went to urgent care with afib dx. 5 mg of metoprolol via EMS, 18 g RAC.

## 2018-06-11 NOTE — PROGRESS NOTES
Identified pt with two pt identifiers(name and ). Chief Complaint   Patient presents with   [facility].     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        Health Maintenance Due   Topic    Hepatitis C Screening     DTaP/Tdap/Td series (1 - Tdap)    ZOSTER VACCINE AGE 60>     GLAUCOMA SCREENING Q2Y     Pneumococcal 65+ Low/Medium Risk (1 of 2 - PCV13)    MEDICARE YEARLY EXAM        Wt Readings from Last 3 Encounters:   18 122 lb (55.3 kg)   18 135 lb (61.2 kg)   17 144 lb 3.2 oz (65.4 kg)     Temp Readings from Last 3 Encounters:   18 97.2 °F (36.2 °C) (Oral)   18 97.9 °F (36.6 °C)   04/15/16 98.2 °F (36.8 °C)     BP Readings from Last 3 Encounters:   18 112/60   18 (!) 201/122   17 164/82     Pulse Readings from Last 3 Encounters:   18 72   18 (!) 116   17 (!) 136         Learning Assessment:  :     No flowsheet data found. Depression Screening:  :     No flowsheet data found. Fall Risk Assessment:  :     No flowsheet data found. Abuse Screening:  :     No flowsheet data found. Coordination of Care Questionnaire:  :     1) Have you been to an emergency room, urgent care clinic since your last visit? yes [place] [facility] [place]  Hospitalized since your last visit? no             2) Have you seen or consulted any other health care providers outside of [facility] since your last visit? yes  (Include any pap smears or colon screenings in this section.)    3) Do you have an Advance Directive on file? no  Are you interested in receiving information about Advance Directives? no    Patient is accompanied by wife I have received verbal consent from Yara Henley to discuss any/all medical information while they are present in the room. Reviewed record in preparation for visit and have obtained necessary documentation.   Medication reconciliation up to date and corrected with patient at this time.

## 2018-06-11 NOTE — PROGRESS NOTES
Subjective:      Angely Lee is a 70 y.o. [facility]. here today to establish care. Patient with recent 2 month hospitalization at [facility]. Discharge summary is not available for review at this time. 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 Anita Ortiz. He was discharged to [facility] where he was admitted for 1.5 weeks and discharged last week. He reports that he has been doing well since being home. Currently denies chest pain or discomfort, dyspnea, cough. He is having normal bowel movements. He does have a port in the right upper chest and is scheduled for dialysis at [facility] three times per week. Per patient, hope is that this will be temporary. General Surgery: Dr. Anita Ortiz   Cardiology: Dr. Olman Araujo Maintenance:  · Colonoscopy: none   · Hepatitis C screening (born between [birth year] and 1967): unknown   · Tetanus: unknown   · Pneumovax: none   · PSA (males): none   · Influenza vaccine: unknown       Current Outpatient Prescriptions   Medication Sig Dispense Refill    colesevelam (WELCHOL) 625 mg tablet Take 3 Tabs by mouth two (2) times a day. 4    cholestyramine (QUESTRAN) 4 gram packet Take 1 Packet by mouth two (2) times daily (with meals). 4    HYDROcodone-acetaminophen (NORCO) 5-325 mg per tablet Take 1 Tab by mouth every four (4) hours as needed. 0    midodrine (PROAMITINE) 2.5 mg tablet Take 4 Tabs by mouth two (2) times a day.  0    apixaban (ELIQUIS) 5 mg tablet Take 5 mg by mouth two (2) times a day.  aspirin 81 mg chewable tablet Take 81 mg by mouth daily.  collagenase (SANTYL) 250 unit/gram ointment Apply  to affected area daily. No Known Allergies      Past medical history - reviewed. Past Medical History:   Diagnosis Date    Atrial fibrillation ([address]     Gout     HTN (hypertension)        Social history - reviewed.    Social History   Substance Use Topics    Smoking status: Former Smoker    Smokeless tobacco: Never Used    Alcohol use 1.0 oz/week     2 Glasses of wine per week        Family history - reviewed. Family History   Problem Relation Age of Onset    Cancer Mother      anal    Stroke Mother        Review of Systems  Pertinent items are noted in HPI. Objective:     Visit Vitals    /60 (BP 1 Location: Right arm, BP Patient Position: Sitting)  Comment: .  Pulse 72    Temp 97.2 °F (36.2 °C) (Oral)  Comment: .  Resp 18    Ht 5' 6\" (1.676 m)    Wt 122 lb (55.3 kg)    SpO2 100%    BMI 19.69 kg/m2      General appearance - alert, well appearing, and in no distress  Eyes - pupils equal and reactive, extraocular eye movements intact, sclera anicteric  Oropharyngx - mucous membranes moist, pharynx normal without lesions  Neck - supple, no significant adenopathy  Chest - clear to auscultation, no wheezes, rales or rhonchi, symmetric air entry, no tachypnea, retractions or cyanosis  Heart - normal rate, regular rhythm, normal S1, S2, no rubs, clicks or gallops, 3/6 systolic mrmur   Abdomen - soft, nontender, nondistended, mid-abdomen bandage and bandage is c/d/i, hypoactive bowel sounds   Extremities - bilateral LE edema up to the knees, no calf tenderness   Skin - normal coloration and turgor, no rashes noted on visualized skin   Mental Status - alert, oriented to person, place, and time, normal mood, behavior, speech, dress, motor activity, and thought processes    Assessment/Plan:   Nargis Carbone is a 70 y.o. male seen for:     1. Essential hypertension: controlled. 2. Atrial fibrillation, unspecified type ([facility] stable. Managed by Dr. Jovan Montoya     3. Encounter to establish care: [facility] medical [facility].     I have discussed the diagnosis with the patient and the intended plan as seen in the above orders.  The patient has received an after-visit summary and questions were answered concerning future plans. I have discussed medication side effects and warnings with the patient as well. Patient verbalizes understanding of plan of care and denies further questions or concerns at this time. Informed patient to return to the office if symptoms worsen or if new symptoms arise.     Follow-up Disposition: Not on File

## 2018-06-22 NOTE — PATIENT INSTRUCTIONS
Leg and Ankle Edema: Care Instructions  Your Care Instructions  Swelling in the legs, ankles, and feet is called edema. It is common after you sit or stand for a while. Long plane flights or car rides often cause swelling in the legs and feet. You may also have swelling if you have to stand for long periods of time at your job. Problems with the veins in the legs (varicose veins) and changes in hormones can also cause swelling. Sometimes the swelling in the ankles and feet is caused by a more serious problem, such as heart failure, infection, blood clots, or liver or kidney disease. Follow-up care is a key part of your treatment and safety. Be sure to make and go to all appointments, and call your doctor if you are having problems. It's also a good idea to know your test results and keep a list of the medicines you take. How can you care for yourself at home? · If your doctor gave you medicine, take it as prescribed. Call your doctor if you think you are having a problem with your medicine. · Whenever you are resting, raise your legs up. Try to keep the swollen area higher than the level of your heart. · Take breaks from standing or sitting in one position. ¨ Walk around to increase the blood flow in your lower legs. ¨ Move your feet and ankles often while you stand, or tighten and relax your leg muscles. · Wear support stockings. Put them on in the morning, before swelling gets worse. · Eat a balanced diet. Lose weight if you need to. · Limit the amount of salt (sodium) in your diet. Salt holds fluid in the body and may increase swelling. When should you call for help? Call 911 anytime you think you may need emergency care. For example, call if:  ? · You have symptoms of a blood clot in your lung (called a pulmonary embolism). These may include:  ¨ Sudden chest pain. ¨ Trouble breathing. ¨ Coughing up blood.    ?Call your doctor now or seek immediate medical care if:  ? · You have signs of a blood clot, such as:  ¨ Pain in your calf, back of the knee, thigh, or groin. ¨ Redness and swelling in your leg or groin. ? · You have symptoms of infection, such as:  ¨ Increased pain, swelling, warmth, or redness. ¨ Red streaks or pus. ¨ A fever. ? Watch closely for changes in your health, and be sure to contact your doctor if:  ? · Your swelling is getting worse. ? · You have new or worsening pain in your legs. ? · You do not get better as expected. Where can you learn more? Go to http://corinna-harini.info/. Enter R997 in the search box to learn more about \"Leg and Ankle Edema: Care Instructions. \"  Current as of: March 20, 2017  Content Version: 11.4  © 4457-7334 [employer]. Care instructions adapted under license by [employer] (which disclaims liability or warranty for this information). If you have questions about a medical condition or this instruction, always ask your healthcare professional. [place] [address] any warranty or liability for your use of this information.

## 2018-06-22 NOTE — PROGRESS NOTES
Chief Complaint   Patient presents with    Leg Swelling     pt states both his legs are swelling today . seem to be getting better now     \"REVIEWED RECORD IN PREPARATION FOR VISIT AND HAVE OBTAINED THE NECESSARY DOCUMENTATION\"  1. Have you been to the ER, urgent care clinic since your last visit? Hospitalized since your last visit? No    2. Have you seen or consulted any other health care providers outside of the [facility] since your last visit? Include any pap smears or colon screening. No  Patient does not have advanced directives.

## 2018-06-22 NOTE — PROGRESS NOTES
HISTORY OF PRESENT ILLNESS  Marichuy Barr is a 70 y.o. male. HPI  Pt presents with \"leg swelling\"    Pt states that his home therapist and nurse noted that he had some swelling in his lower legs this morning. Pt states that since his surgery,he has had some swelling off and on, but it seemed to be worse today. Pt denies any symptoms with the swelling. No pain in legs, no chest pain, no shortness of breath, no headaches. Pt states that his BP has been stable at home. He continues to do dialysis, and had dialysis yesterday. Pt is followed by Dr. Viky Sandoval, cardiology, and is unsure of his next follow up appointment with him at this time. Review of Systems   Constitutional: Negative for fever. HENT: Negative for congestion. Respiratory: Negative for cough and shortness of breath. Cardiovascular: Positive for leg swelling. Negative for chest pain and palpitations. Gastrointestinal: Negative for diarrhea and vomiting. Physical Exam   Constitutional: He is oriented to person, place, and time. He appears well-developed and well-nourished. HENT:   Head: Normocephalic and atraumatic. Cardiovascular: Normal rate, regular rhythm and normal heart sounds. Pulmonary/Chest: Effort normal and breath sounds normal.   Musculoskeletal:        Right lower leg: He exhibits edema. Left lower leg: He exhibits edema. Legs:  Neurological: He is alert and oriented to person, place, and time. Skin: Skin is warm and dry. Psychiatric: He has a normal mood and affect. His behavior is normal.       ASSESSMENT and PLAN    ICD-10-CM ICD-9-CM    1. Localized edema S91.0 307.6 METABOLIC PANEL, COMPREHENSIVE     Informed patient that we will notify him when his labs return, and see if we can see reasoning for edema.   Educated about following up with cardiology, should symptoms persist.  Educated about always calling 911 or going to the ER with ANY chest pain, shortness of breath, etc.    Pt informed to return to office with worsening of symptoms, or PRN with any questions or concerns. Pt verbalizes understanding of plan of care and denies further questions or concerns at this time.

## 2018-06-22 NOTE — MR AVS SNAPSHOT
[facility] 
 
 
 [address] [facility] 
[phone] Patient: Tressa Alvarado MRN: [id number] Visit Information Date & Time Provider Department Dept. Phone Encounter #  
 6/22/2018  3:30 PM He Ferreira Sher Alex [phone] [id number] Follow-up Instructions Return if symptoms worsen or fail to improve. Upcoming Health Maintenance Date Due Hepatitis C Screening 1955 DTaP/Tdap/Td series (1 - Tdap) 5/10/1974 ZOSTER VACCINE AGE 60> 3/10/2013 GLAUCOMA SCREENING Q2Y 5/10/2018 Pneumococcal 65+ Low/Medium Risk (1 of 2 - PCV13) 5/10/2018 MEDICARE YEARLY EXAM 6/9/2018 Influenza Age 5 to Adult 8/1/2018 FOBT Q 1 YEAR AGE 50-75 3/20/2019 Allergies as of 6/22/2018  Review Complete On: 6/22/2018 By: He Ferreira NP No Known Allergies Current Immunizations  Never Reviewed No immunizations on file. Not reviewed this visit You Were Diagnosed With   
  
 Codes Comments Localized edema    -  Primary ICD-10-CM: R60.0 ICD-9-CM: 571. 3 Vitals BP Pulse Temp Resp Height(growth percentile) SpO2  
 102/69 (!) 56 98 °F (36.7 °C) (Oral) 16 5' 6\" (1.676 m) 97% Smoking Status Former Smoker Preferred Pharmacy Pharmacy Name Phone [facility], [address] [place] [id number] [facility]. [phone] Your Updated Medication List  
  
   
This list is accurate as of 6/22/18  3:52 PM.  Always use your most recent med list.  
  
  
  
  
 apixaban 5 mg tablet Commonly known as:  Mp Sayer Take 5 mg by mouth two (2) times a day. aspirin 81 mg chewable tablet Take 81 mg by mouth daily. cholestyramine 4 gram packet Commonly known as:  Shi Puff Take 1 Packet by mouth two (2) times daily (with meals). colesevelam 625 mg tablet Commonly known as:  [facility]  
 Take 3 Tabs by mouth two (2) times a day. HYDROcodone-acetaminophen 5-325 mg per tablet Commonly known as:  Angela [place] Take 1 Tab by mouth every four (4) hours as needed. lisinopril-hydroCHLOROthiazide 20-12.5 mg per tablet Commonly known as:  Trini [place] Take  by mouth.  
  
 midodrine 2.5 mg tablet Commonly known as:  Alexandra Silver Take 4 Tabs by mouth two (2) times a day. SANTYL 250 unit/gram ointment Generic drug:  collagenase Apply  to affected area daily. TOPROL XL 50 mg XL tablet Generic drug:  metoprolol succinate Take  by mouth. We Performed the Following METABOLIC PANEL, COMPREHENSIVE [84060 CPT(R)] Follow-up Instructions Return if symptoms worsen or fail to improve. Patient Instructions Leg and Ankle Edema: Care Instructions Your Care Instructions Swelling in the legs, ankles, and feet is called edema. It is common after you sit or stand for a while. Long plane flights or car rides often cause swelling in the legs and feet. You may also have swelling if you have to stand for long periods of time at your job. Problems with the veins in the legs (varicose veins) and changes in hormones can also cause swelling. Sometimes the swelling in the ankles and feet is caused by a more serious problem, such as heart failure, infection, blood clots, or liver or kidney disease. Follow-up care is a key part of your treatment and safety. Be sure to make and go to all appointments, and call your doctor if you are having problems. It's also a good idea to know your test results and keep a list of the medicines you take. How can you care for yourself at home? · If your doctor gave you medicine, take it as prescribed. Call your doctor if you think you are having a problem with your medicine. · Whenever you are resting, raise your legs up. Try to keep the swollen area higher than the level of your heart. · Take breaks from standing or sitting in one position. ¨ Walk around to increase the blood flow in your lower legs. ¨ Move your feet and ankles often while you stand, or tighten and relax your leg muscles. · Wear support stockings. Put them on in the morning, before swelling gets worse. · Eat a balanced diet. Lose weight if you need to. · Limit the amount of salt (sodium) in your diet. Salt holds fluid in the body and may increase swelling. When should you call for help? Call 911 anytime you think you may need emergency care. For example, call if: 
? · You have symptoms of a blood clot in your lung (called a pulmonary embolism). These may include: 
¨ Sudden chest pain. ¨ Trouble breathing. ¨ Coughing up blood. ?Call your doctor now or seek immediate medical care if: 
? · You have signs of a blood clot, such as: 
¨ Pain in your calf, back of the knee, thigh, or groin. ¨ Redness and swelling in your leg or groin. ? · You have symptoms of infection, such as: 
¨ Increased pain, swelling, warmth, or redness. ¨ Red streaks or pus. ¨ A fever. ? Watch closely for changes in your health, and be sure to contact your doctor if: 
? · Your swelling is getting worse. ? · You have new or worsening pain in your legs. ? · You do not get better as expected. Where can you learn more? Go to http://corinna-harini.info/. Enter S822 in the search box to learn more about \"Leg and Ankle Edema: Care Instructions. \" Current as of: March 20, 2017 Content Version: 11.4 © 4148-3079 [employer]. Care instructions adapted under license by [employer] (which disclaims liability or warranty for this information). If you have questions about a medical condition or this instruction, always ask your healthcare professional. [place] [address] any warranty or liability for your use of this information. Introducing [facility] SERVICES! Dorina Mendenhall introduces [employer] patient portal. Now you can access parts of your medical record, email your doctor's office, and request medication refills online. 1. In your internet browser, go to [email or web address] 2. Click on the First Time User? Click Here link in the Sign In box. You will see the New Member Sign Up page. 3. Enter your [employer] Access Code exactly as it appears below. You will not need to use this code after youve completed the sign-up process. If you do not sign up before the expiration date, you must request a new code. · [employer] Access Code: [email or web address] Expires: 7/11/2018 10:44 AM 
 
4. Enter the last four digits of your Social Security Number (xxxx) and Date of Birth (mm/dd/yyyy) as indicated and click Submit. You will be taken to the next sign-up page. 5. Create a [employer] ID. This will be your [employer] login ID and cannot be changed, so think of one that is secure and easy to remember. 6. Create a [employer] password. You can change your password at any time. 7. Enter your Password Reset Question and Answer. This can be used at a later time if you forget your password. 8. Enter your e-mail address. You will receive e-mail notification when new information is available in [address] 9. Click Sign Up. You can now view and download portions of your medical record. 10. Click the Download Summary menu link to download a portable copy of your medical information. If you have questions, please visit the Frequently Asked Questions section of the [employer] website. Remember, [employer] is NOT to be used for urgent needs. For medical emergencies, dial 911. Now available from your iPhone and Android! Please provide this summary of care documentation to your next provider. Your primary care clinician is listed as Prema Wright. If you have any questions after today's visit, please call [phone].

## 2018-06-25 NOTE — TELEPHONE ENCOUNTER
----- Message from Loretta Carrera NP sent at 6/25/2018  7:34 AM EDT -----  Please call patient and let him know that his labs returned. Kidney function is much worse then when checked 3 months ago. This could correlate with the edema. When does he see nephrology next? I know he is currently going through dialysis. How's the edema?   Thanks

## 2018-06-25 NOTE — PROGRESS NOTES
Please call patient and let him know that his labs returned. Kidney function is much worse then when checked 3 months ago. This could correlate with the edema. When does he see nephrology next? I know he is currently going through dialysis. How's the edema?   Thanks

## 2018-06-25 NOTE — TELEPHONE ENCOUNTER
Spoke with pt. States he thinks his edema is better. States he has an appt soon with nephro but cant remember exactly when. States he will let him know. Pt states he isnt as tired now either.